# Patient Record
Sex: FEMALE | Race: WHITE | NOT HISPANIC OR LATINO | URBAN - METROPOLITAN AREA
[De-identification: names, ages, dates, MRNs, and addresses within clinical notes are randomized per-mention and may not be internally consistent; named-entity substitution may affect disease eponyms.]

---

## 2021-02-19 ENCOUNTER — LAB OUTSIDE AN ENCOUNTER (OUTPATIENT)
Dept: URBAN - METROPOLITAN AREA CLINIC 94 | Facility: CLINIC | Age: 66
End: 2021-02-19

## 2021-02-19 ENCOUNTER — OFFICE VISIT (OUTPATIENT)
Dept: URBAN - METROPOLITAN AREA CLINIC 94 | Facility: CLINIC | Age: 66
End: 2021-02-19
Payer: COMMERCIAL

## 2021-02-19 ENCOUNTER — WEB ENCOUNTER (OUTPATIENT)
Dept: URBAN - METROPOLITAN AREA CLINIC 94 | Facility: CLINIC | Age: 66
End: 2021-02-19

## 2021-02-19 DIAGNOSIS — K76.0 STEATOSIS OF LIVER: ICD-10-CM

## 2021-02-19 DIAGNOSIS — Z86.010 PERSONAL HISTORY OF COLON POLYPS: ICD-10-CM

## 2021-02-19 DIAGNOSIS — Z80.0 FAMILY HISTORY OF COLON CANCER: ICD-10-CM

## 2021-02-19 PROCEDURE — G8427 DOCREV CUR MEDS BY ELIG CLIN: HCPCS | Performed by: INTERNAL MEDICINE

## 2021-02-19 PROCEDURE — 99213 OFFICE O/P EST LOW 20 MIN: CPT | Performed by: INTERNAL MEDICINE

## 2021-02-19 PROCEDURE — 1036F TOBACCO NON-USER: CPT | Performed by: INTERNAL MEDICINE

## 2021-02-19 PROCEDURE — 3017F COLORECTAL CA SCREEN DOC REV: CPT | Performed by: INTERNAL MEDICINE

## 2021-02-19 PROCEDURE — G9903 PT SCRN TBCO ID AS NON USER: HCPCS | Performed by: INTERNAL MEDICINE

## 2021-02-19 RX ORDER — GLIMEPIRIDE 4 MG/1
1 TABLET WITH BREAKFAST OR THE FIRST MAIN MEAL OF THE DAY TABLET ORAL BID
Status: ACTIVE | COMMUNITY

## 2021-02-19 RX ORDER — INSULIN GLARGINE 100 [IU]/ML
AS DIRECTED INJECTION, SOLUTION SUBCUTANEOUS
Status: ACTIVE | COMMUNITY

## 2021-02-19 RX ORDER — OMEPRAZOLE 40 MG/1
TAKE 1 CAPSULE (40 MG) BY ORAL ROUTE ONCE DAILY BEFORE A MEAL FOR 90 DAYS CAPSULE, DELAYED RELEASE PELLETS ORAL 1
Qty: 90 | Refills: 1 | Status: DISCONTINUED | COMMUNITY
Start: 2017-12-13

## 2021-02-19 RX ORDER — HYOSCYAMINE SULFATE 0.12 MG/1
PLACE 1 TABLET UNDER TONGUE BY TRANSLINGUAL ROUTE 2 TIMES A DAY AS NEEDED FOR 30 DAYS TABLET, ORALLY DISINTEGRATING ORAL 2
Qty: 60 | Refills: 5 | Status: DISCONTINUED | COMMUNITY
Start: 2017-12-13

## 2021-02-19 RX ORDER — ASCORBIC ACID 1000 MG
1 TABLET TABLET ORAL ONCE A DAY
Status: ACTIVE | COMMUNITY

## 2021-02-19 RX ORDER — ACETAMINOPHEN 500 MG
1 CAPSULE TABLET ORAL DAILY
Status: ACTIVE | COMMUNITY

## 2021-02-19 RX ORDER — LISINOPRIL AND HYDROCHLOROTHIAZIDE 20; 25 MG/1; MG/1
1 TABLET TABLET ORAL ONCE A DAY
Refills: 0 | Status: ACTIVE | COMMUNITY
Start: 1900-01-01

## 2021-02-19 RX ORDER — SIMVASTATIN 40 MG/1
1 TABLET IN THE EVENING TABLET, FILM COATED ORAL ONCE A DAY
Status: ACTIVE | COMMUNITY

## 2021-02-19 RX ORDER — METFORMIN HYDROCHLORIDE 500 MG/1
1 TABLET WITH A MEAL TABLET, FILM COATED ORAL BID
Status: ACTIVE | COMMUNITY

## 2021-02-19 RX ORDER — GABAPENTIN 300 MG/1
1 TABLET CAPSULE ORAL AT BEDTIME
Refills: 0 | Status: ACTIVE | COMMUNITY
Start: 1900-01-01

## 2021-02-19 NOTE — HPI-TODAY'S VISIT:
Ms. Vaughn presents today for surveillance colonoscopy.  Her last colonoscopy was in 2017 by Dr. Preciado with complaints of diarrhea.  She was found to have several (4) polyps which were removed and found to be tubular adenomas and hyperplastic polyps.  She reports that she has 2 brothers with colon cancer.  She denies blood in stool, rectal bleeding, abdominal pain, weight loss.  She reports diarrhea with taking metformin.  She has ocassional pyrosis which she successfully treats with OTC antacids.  The risks benefits and alternatives were discussed.

## 2021-03-26 ENCOUNTER — OFFICE VISIT (OUTPATIENT)
Dept: URBAN - METROPOLITAN AREA SURGERY CENTER 17 | Facility: SURGERY CENTER | Age: 66
End: 2021-03-26

## 2021-04-07 PROBLEM — 428283002 HISTORY OF POLYP OF COLON: Status: ACTIVE | Noted: 2021-02-19

## 2021-04-14 ENCOUNTER — OFFICE VISIT (OUTPATIENT)
Dept: URBAN - METROPOLITAN AREA CLINIC 94 | Facility: CLINIC | Age: 66
End: 2021-04-14

## 2021-04-30 ENCOUNTER — CLAIMS CREATED FROM THE CLAIM WINDOW (OUTPATIENT)
Dept: URBAN - METROPOLITAN AREA CLINIC 4 | Facility: CLINIC | Age: 66
End: 2021-04-30
Payer: COMMERCIAL

## 2021-04-30 ENCOUNTER — OFFICE VISIT (OUTPATIENT)
Dept: URBAN - METROPOLITAN AREA SURGERY CENTER 17 | Facility: SURGERY CENTER | Age: 66
End: 2021-04-30
Payer: COMMERCIAL

## 2021-04-30 DIAGNOSIS — D12.0 BENIGN NEOPLASM OF CECUM: ICD-10-CM

## 2021-04-30 DIAGNOSIS — K63.89 STENOSIS OF ILEOCECAL VALVE: ICD-10-CM

## 2021-04-30 DIAGNOSIS — D12.0 ADENOMA OF CECUM: ICD-10-CM

## 2021-04-30 DIAGNOSIS — Z86.010 H/O ADENOMATOUS POLYP OF COLON: ICD-10-CM

## 2021-04-30 DIAGNOSIS — D12.3 BENIGN NEOPLASM OF TRANSVERSE COLON: ICD-10-CM

## 2021-04-30 DIAGNOSIS — K63.89 BACTERIAL OVERGROWTH SYNDROME: ICD-10-CM

## 2021-04-30 DIAGNOSIS — D12.3 ADENOMA OF TRANSVERSE COLON: ICD-10-CM

## 2021-04-30 PROCEDURE — 88305 TISSUE EXAM BY PATHOLOGIST: CPT | Performed by: PATHOLOGY

## 2021-04-30 PROCEDURE — 45380 COLONOSCOPY AND BIOPSY: CPT | Performed by: INTERNAL MEDICINE

## 2021-04-30 PROCEDURE — 45385 COLONOSCOPY W/LESION REMOVAL: CPT | Performed by: INTERNAL MEDICINE

## 2021-04-30 PROCEDURE — G8907 PT DOC NO EVENTS ON DISCHARG: HCPCS | Performed by: INTERNAL MEDICINE

## 2021-04-30 RX ORDER — GABAPENTIN 300 MG/1
1 TABLET CAPSULE ORAL AT BEDTIME
Refills: 0 | Status: ACTIVE | COMMUNITY
Start: 1900-01-01

## 2021-04-30 RX ORDER — LISINOPRIL AND HYDROCHLOROTHIAZIDE 20; 25 MG/1; MG/1
1 TABLET TABLET ORAL ONCE A DAY
Refills: 0 | Status: ACTIVE | COMMUNITY
Start: 1900-01-01

## 2021-04-30 RX ORDER — INSULIN GLARGINE 100 [IU]/ML
AS DIRECTED INJECTION, SOLUTION SUBCUTANEOUS
Status: ACTIVE | COMMUNITY

## 2021-04-30 RX ORDER — SIMVASTATIN 40 MG/1
1 TABLET IN THE EVENING TABLET, FILM COATED ORAL ONCE A DAY
Status: ACTIVE | COMMUNITY

## 2021-04-30 RX ORDER — ASCORBIC ACID 1000 MG
1 TABLET TABLET ORAL ONCE A DAY
Status: ACTIVE | COMMUNITY

## 2021-04-30 RX ORDER — METFORMIN HYDROCHLORIDE 500 MG/1
1 TABLET WITH A MEAL TABLET, FILM COATED ORAL BID
Status: ACTIVE | COMMUNITY

## 2021-04-30 RX ORDER — ACETAMINOPHEN 500 MG
1 CAPSULE TABLET ORAL DAILY
Status: ACTIVE | COMMUNITY

## 2021-04-30 RX ORDER — GLIMEPIRIDE 4 MG/1
1 TABLET WITH BREAKFAST OR THE FIRST MAIN MEAL OF THE DAY TABLET ORAL BID
Status: ACTIVE | COMMUNITY

## 2021-05-12 ENCOUNTER — OFFICE VISIT (OUTPATIENT)
Dept: URBAN - METROPOLITAN AREA CLINIC 94 | Facility: CLINIC | Age: 66
End: 2021-05-12

## 2021-05-18 ENCOUNTER — OFFICE VISIT (OUTPATIENT)
Dept: URBAN - METROPOLITAN AREA CLINIC 94 | Facility: CLINIC | Age: 66
End: 2021-05-18
Payer: COMMERCIAL

## 2021-05-18 ENCOUNTER — LAB OUTSIDE AN ENCOUNTER (OUTPATIENT)
Dept: URBAN - METROPOLITAN AREA CLINIC 94 | Facility: CLINIC | Age: 66
End: 2021-05-18

## 2021-05-18 ENCOUNTER — TELEPHONE ENCOUNTER (OUTPATIENT)
Dept: URBAN - METROPOLITAN AREA CLINIC 92 | Facility: CLINIC | Age: 66
End: 2021-05-18

## 2021-05-18 VITALS
BODY MASS INDEX: 36.19 KG/M2 | HEART RATE: 64 BPM | WEIGHT: 212 LBS | HEIGHT: 64 IN | DIASTOLIC BLOOD PRESSURE: 75 MMHG | TEMPERATURE: 98.4 F | SYSTOLIC BLOOD PRESSURE: 140 MMHG

## 2021-05-18 DIAGNOSIS — R19.7 DIARRHEA, UNSPECIFIED TYPE: ICD-10-CM

## 2021-05-18 DIAGNOSIS — R13.13 PHARYNGEAL DYSPHAGIA: ICD-10-CM

## 2021-05-18 DIAGNOSIS — R12 PYROSIS: ICD-10-CM

## 2021-05-18 DIAGNOSIS — Z86.010 HISTORY OF COLONIC POLYPS: ICD-10-CM

## 2021-05-18 PROBLEM — 428283002: Status: ACTIVE | Noted: 2021-05-18

## 2021-05-18 PROCEDURE — 99213 OFFICE O/P EST LOW 20 MIN: CPT | Performed by: INTERNAL MEDICINE

## 2021-05-18 RX ORDER — GLIMEPIRIDE 4 MG/1
1 TABLET WITH BREAKFAST OR THE FIRST MAIN MEAL OF THE DAY TABLET ORAL BID
Status: ACTIVE | COMMUNITY

## 2021-05-18 RX ORDER — SIMVASTATIN 40 MG/1
1 TABLET IN THE EVENING TABLET, FILM COATED ORAL ONCE A DAY
Status: ACTIVE | COMMUNITY

## 2021-05-18 RX ORDER — ASCORBIC ACID 1000 MG
1 TABLET TABLET ORAL ONCE A DAY
Status: ON HOLD | COMMUNITY

## 2021-05-18 RX ORDER — INSULIN GLARGINE 100 [IU]/ML
AS DIRECTED INJECTION, SOLUTION SUBCUTANEOUS
Status: ACTIVE | COMMUNITY

## 2021-05-18 RX ORDER — GABAPENTIN 300 MG/1
1 TABLET CAPSULE ORAL AT BEDTIME
Refills: 0 | Status: ACTIVE | COMMUNITY
Start: 1900-01-01

## 2021-05-18 RX ORDER — METFORMIN HYDROCHLORIDE 500 MG/1
1 TABLET WITH A MEAL TABLET, FILM COATED ORAL BID
Status: ACTIVE | COMMUNITY

## 2021-05-18 RX ORDER — ACETAMINOPHEN 500 MG
1 CAPSULE TABLET ORAL DAILY
Status: ACTIVE | COMMUNITY

## 2021-05-18 RX ORDER — OMEPRAZOLE 40 MG/1
1 CAPSULE 30 MINUTES BEFORE MORNING MEAL CAPSULE, DELAYED RELEASE ORAL ONCE A DAY
Qty: 30 | OUTPATIENT
Start: 2021-05-18

## 2021-05-18 RX ORDER — LISINOPRIL AND HYDROCHLOROTHIAZIDE 20; 25 MG/1; MG/1
1 TABLET TABLET ORAL ONCE A DAY
Refills: 0 | Status: ACTIVE | COMMUNITY
Start: 1900-01-01

## 2021-05-18 NOTE — HPI-TODAY'S VISIT:
Ms. Vaughn presents today in followup from her colonoscopy 4/30/21.  Findings included one 8 mm tubular adenoma in the cecum and two 5-7 mm tubular adenomas in the transverse colon.  She reports a family history of 2 brothers with colon cancer, one with stage IV and liver cancer.  She also reports an uncle with colon cancer.  1st degree relative counseling provided in regards to her three children over the age of 40.   Ms. Vaughn also reports concerns with heartburn 4-5 times per week regardless of food intake.  She also reports dysphagia with some solids and fluids which occurs 2-3 times per week and has been present for several years.  Of note - she reports gastric sleeve 6 years ago.  She also complains of loose stools with no identified triggers.  She has not been out of the country, does not have nausea/vomiting.  This has improved some since decreasing dose of metformin from 1 gram to 50 mg twice daily.

## 2021-05-20 PROBLEM — 21101000119105: Status: ACTIVE | Noted: 2021-05-18

## 2021-06-04 ENCOUNTER — OFFICE VISIT (OUTPATIENT)
Dept: URBAN - METROPOLITAN AREA SURGERY CENTER 17 | Facility: SURGERY CENTER | Age: 66
End: 2021-06-04
Payer: COMMERCIAL

## 2021-06-04 DIAGNOSIS — K22.4 CORKSCREW ESOPHAGUS: ICD-10-CM

## 2021-06-04 DIAGNOSIS — R13.19 CERVICAL DYSPHAGIA: ICD-10-CM

## 2021-06-04 DIAGNOSIS — K31.89 ACQUIRED DEFORMITY OF DUODENUM: ICD-10-CM

## 2021-06-04 DIAGNOSIS — K21.00 ALKALINE REFLUX ESOPHAGITIS: ICD-10-CM

## 2021-06-04 PROCEDURE — 43239 EGD BIOPSY SINGLE/MULTIPLE: CPT | Performed by: INTERNAL MEDICINE

## 2021-06-04 PROCEDURE — 43249 ESOPH EGD DILATION <30 MM: CPT | Performed by: INTERNAL MEDICINE

## 2021-06-04 PROCEDURE — G8907 PT DOC NO EVENTS ON DISCHARG: HCPCS | Performed by: INTERNAL MEDICINE

## 2021-06-04 RX ORDER — ACETAMINOPHEN 500 MG
1 CAPSULE TABLET ORAL DAILY
Status: ACTIVE | COMMUNITY

## 2021-06-04 RX ORDER — GABAPENTIN 300 MG/1
1 TABLET CAPSULE ORAL AT BEDTIME
Refills: 0 | Status: ACTIVE | COMMUNITY
Start: 1900-01-01

## 2021-06-04 RX ORDER — METFORMIN HYDROCHLORIDE 500 MG/1
1 TABLET WITH A MEAL TABLET, FILM COATED ORAL BID
Status: ACTIVE | COMMUNITY

## 2021-06-04 RX ORDER — ASCORBIC ACID 1000 MG
1 TABLET TABLET ORAL ONCE A DAY
Status: ON HOLD | COMMUNITY

## 2021-06-04 RX ORDER — LISINOPRIL AND HYDROCHLOROTHIAZIDE 20; 25 MG/1; MG/1
1 TABLET TABLET ORAL ONCE A DAY
Refills: 0 | Status: ACTIVE | COMMUNITY
Start: 1900-01-01

## 2021-06-04 RX ORDER — GLIMEPIRIDE 4 MG/1
1 TABLET WITH BREAKFAST OR THE FIRST MAIN MEAL OF THE DAY TABLET ORAL BID
Status: ACTIVE | COMMUNITY

## 2021-06-04 RX ORDER — SIMVASTATIN 40 MG/1
1 TABLET IN THE EVENING TABLET, FILM COATED ORAL ONCE A DAY
Status: ACTIVE | COMMUNITY

## 2021-06-04 RX ORDER — OMEPRAZOLE 40 MG/1
1 CAPSULE 30 MINUTES BEFORE MORNING MEAL CAPSULE, DELAYED RELEASE ORAL ONCE A DAY
Qty: 30 | Status: ACTIVE | COMMUNITY
Start: 2021-05-18

## 2021-06-04 RX ORDER — INSULIN GLARGINE 100 [IU]/ML
AS DIRECTED INJECTION, SOLUTION SUBCUTANEOUS
Status: ACTIVE | COMMUNITY

## 2021-07-06 ENCOUNTER — DASHBOARD ENCOUNTERS (OUTPATIENT)
Age: 66
End: 2021-07-06

## 2021-07-06 ENCOUNTER — LAB OUTSIDE AN ENCOUNTER (OUTPATIENT)
Dept: URBAN - METROPOLITAN AREA CLINIC 94 | Facility: CLINIC | Age: 66
End: 2021-07-06

## 2021-07-06 ENCOUNTER — OFFICE VISIT (OUTPATIENT)
Dept: URBAN - METROPOLITAN AREA CLINIC 94 | Facility: CLINIC | Age: 66
End: 2021-07-06
Payer: COMMERCIAL

## 2021-07-06 VITALS
HEART RATE: 65 BPM | HEIGHT: 64 IN | TEMPERATURE: 97.3 F | DIASTOLIC BLOOD PRESSURE: 58 MMHG | BODY MASS INDEX: 36.37 KG/M2 | SYSTOLIC BLOOD PRESSURE: 156 MMHG | WEIGHT: 213 LBS

## 2021-07-06 DIAGNOSIS — K29.50 CHRONIC GASTRITIS WITHOUT BLEEDING, UNSPECIFIED GASTRITIS TYPE: ICD-10-CM

## 2021-07-06 DIAGNOSIS — K21.9 GASTROESOPHAGEAL REFLUX DISEASE WITHOUT ESOPHAGITIS: ICD-10-CM

## 2021-07-06 DIAGNOSIS — R19.7 DIARRHEA, UNSPECIFIED TYPE: ICD-10-CM

## 2021-07-06 PROCEDURE — 99213 OFFICE O/P EST LOW 20 MIN: CPT | Performed by: INTERNAL MEDICINE

## 2021-07-06 RX ORDER — GABAPENTIN 300 MG/1
1 TABLET CAPSULE ORAL AT BEDTIME
Refills: 0 | Status: ACTIVE | COMMUNITY
Start: 1900-01-01

## 2021-07-06 RX ORDER — METFORMIN HYDROCHLORIDE 500 MG/1
1 TABLET WITH A MEAL TABLET, FILM COATED ORAL BID
Status: ACTIVE | COMMUNITY

## 2021-07-06 RX ORDER — INSULIN GLARGINE 100 [IU]/ML
AS DIRECTED INJECTION, SOLUTION SUBCUTANEOUS
Status: ACTIVE | COMMUNITY

## 2021-07-06 RX ORDER — GLIMEPIRIDE 4 MG/1
1 TABLET WITH BREAKFAST OR THE FIRST MAIN MEAL OF THE DAY TABLET ORAL BID
Status: ACTIVE | COMMUNITY

## 2021-07-06 RX ORDER — ASCORBIC ACID 1000 MG
1 TABLET TABLET ORAL ONCE A DAY
Status: ON HOLD | COMMUNITY

## 2021-07-06 RX ORDER — OMEPRAZOLE 40 MG/1
1 CAPSULE 30 MINUTES BEFORE MORNING MEAL CAPSULE, DELAYED RELEASE ORAL ONCE A DAY
Qty: 90 | Refills: 2
Start: 2021-05-18

## 2021-07-06 RX ORDER — OMEPRAZOLE 40 MG/1
1 CAPSULE 30 MINUTES BEFORE MORNING MEAL CAPSULE, DELAYED RELEASE ORAL ONCE A DAY
Qty: 30 | Status: ACTIVE | COMMUNITY
Start: 2021-05-18

## 2021-07-06 RX ORDER — ACETAMINOPHEN 500 MG
1 CAPSULE TABLET ORAL DAILY
Status: ACTIVE | COMMUNITY

## 2021-07-06 RX ORDER — SIMVASTATIN 40 MG/1
1 TABLET IN THE EVENING TABLET, FILM COATED ORAL ONCE A DAY
Status: ACTIVE | COMMUNITY

## 2021-07-06 RX ORDER — LISINOPRIL AND HYDROCHLOROTHIAZIDE 20; 25 MG/1; MG/1
1 TABLET TABLET ORAL ONCE A DAY
Refills: 0 | Status: ACTIVE | COMMUNITY
Start: 1900-01-01

## 2021-07-06 NOTE — HPI-TODAY'S VISIT:
Ms. Vaughn is seen today for a scheduled follow up visit post EGD for pyrosis and dysphagia.  EGD revealed erythema of the stomach and sleeve as well as spasticity of the esophagus.  Esophagus was dilated; she had previously been started on omeprazole.  Today she reports feeling better, no further difficulty swallowing and no further acid reflux.  She states that her IBS is giving her problems with increased diarrhea over the past 2 weeks. She reports rare episode of constipation.  GERD is controlled with omeprazole.  5/18 visit: Ms. Vaughn presents today in follow up from her colonoscopy 4/30/21.  Findings included one 8 mm tubular adenoma in the cecum and two 5-7 mm tubular adenomas in the transverse colon.  She reports a family history of 2 brothers with colon cancer, one with stage IV and liver cancer.  She also reports an uncle with colon cancer.  1st degree relative counseling provided in regards to her three children over the age of 40.   Ms. Vaughn also reports concerns with heartburn 4-5 times per week regardless of food intake.  She also reports dysphagia with some solids and fluids which occurs 2-3 times per week and has been present for several years.  Of note - she reports gastric sleeve 6 years ago.  She also complains of loose stools with no identified triggers.  She has not been out of the country, does not have nausea/vomiting.  This has improved some since decreasing dose of metformin from 1 gram to 500 mg twice daily.

## 2021-07-08 PROBLEM — 266435005: Status: ACTIVE | Noted: 2021-07-06

## 2021-07-08 PROBLEM — 8493009: Status: ACTIVE | Noted: 2021-07-06

## 2024-07-12 ENCOUNTER — OFFICE VISIT (OUTPATIENT)
Dept: URBAN - METROPOLITAN AREA CLINIC 94 | Facility: CLINIC | Age: 69
End: 2024-07-12
Payer: MEDICARE

## 2024-07-12 VITALS
HEIGHT: 64 IN | BODY MASS INDEX: 38.76 KG/M2 | WEIGHT: 227 LBS | DIASTOLIC BLOOD PRESSURE: 71 MMHG | HEART RATE: 70 BPM | OXYGEN SATURATION: 95 % | TEMPERATURE: 98.1 F | SYSTOLIC BLOOD PRESSURE: 159 MMHG

## 2024-07-12 DIAGNOSIS — R19.4 CHANGE IN BOWEL HABITS: ICD-10-CM

## 2024-07-12 DIAGNOSIS — R13.19 ESOPHAGEAL DYSPHAGIA: ICD-10-CM

## 2024-07-12 PROCEDURE — 99203 OFFICE O/P NEW LOW 30 MIN: CPT

## 2024-07-12 RX ORDER — INSULIN DEGLUDEC INJECTION 100 U/ML
AS DIRECTED INJECTION, SOLUTION SUBCUTANEOUS
Status: ACTIVE | COMMUNITY

## 2024-07-12 RX ORDER — PIOGLITAZONE 30 MG/1
1 TABLET TABLET ORAL ONCE A DAY
Status: ACTIVE | COMMUNITY

## 2024-07-12 RX ORDER — OMEPRAZOLE 40 MG/1
1 CAPSULE 30 MINUTES BEFORE MORNING MEAL CAPSULE, DELAYED RELEASE ORAL ONCE A DAY
Qty: 90 | Refills: 2 | Status: ACTIVE | COMMUNITY
Start: 2021-05-18

## 2024-07-12 RX ORDER — LISINOPRIL AND HYDROCHLOROTHIAZIDE 20; 25 MG/1; MG/1
1 TABLET TABLET ORAL ONCE A DAY
Refills: 0 | Status: ACTIVE | COMMUNITY
Start: 1900-01-01

## 2024-07-12 RX ORDER — ACETAMINOPHEN 500 MG
1 CAPSULE TABLET ORAL DAILY
Status: ACTIVE | COMMUNITY

## 2024-07-12 RX ORDER — FAMOTIDINE 20 MG/1
TAKE 1 TABLET TABLET, FILM COATED ORAL ONCE A DAY
Qty: 30 | Refills: 1 | OUTPATIENT
Start: 2024-07-12

## 2024-07-12 RX ORDER — SIMVASTATIN 40 MG/1
1 TABLET IN THE EVENING TABLET, FILM COATED ORAL ONCE A DAY
Status: ACTIVE | COMMUNITY

## 2024-07-12 RX ORDER — AMLODIPINE BESYLATE 5 MG/1
1 TABLET TABLET ORAL ONCE A DAY
Status: ACTIVE | COMMUNITY

## 2024-07-12 RX ORDER — METFORMIN HYDROCHLORIDE 500 MG/1
1 TABLET WITH A MEAL TABLET, FILM COATED ORAL BID
Status: ACTIVE | COMMUNITY

## 2024-07-12 NOTE — HPI-TODAY'S VISIT:
69 y/o F hx of GERD presents for NP eval of dysphagia, change in bowel habits.  Reports change in bowel habits with increase fecal urgency for last several mon. Does have hx of chronic diarrhea but admits this is different pattern for her. Does not endorse diarrhea or loose stool but admits to increase frequency of BMs upt to 3x/d. No melena, hematochezia. Prior used to take Immodium PRN for diarrhea but admits this gives her constipation. Denies any abd pain, unintentional wt loss.   Also endorses liquid dysphagia x 6 mo. Does have hx of dysphagia with dilation in 2021 - admits this gave her good relief but noticed return a few mo ago. Denies epigastric pain, n/v, reflux. Takes omeprazole QD for several yrs.  Denies dysphagia to solid food, pills and no odynophagia.   Brother recently passed from stage 4 CRC last yr.  Pt is currently being eval'd by cardio for abnl EKG. No lakeisha gdisease. No blood thinners

## 2024-09-09 ENCOUNTER — ERX REFILL RESPONSE (OUTPATIENT)
Dept: URBAN - METROPOLITAN AREA CLINIC 94 | Facility: CLINIC | Age: 69
End: 2024-09-09

## 2024-09-09 RX ORDER — FAMOTIDINE 20 MG/1
TAKE 1 TABLET TABLET, FILM COATED ORAL ONCE A DAY
Qty: 30 | Refills: 1 | OUTPATIENT

## 2025-06-02 ENCOUNTER — TELEPHONE ENCOUNTER (OUTPATIENT)
Dept: URBAN - METROPOLITAN AREA CLINIC 94 | Facility: CLINIC | Age: 70
End: 2025-06-02

## 2025-06-03 ENCOUNTER — OFFICE VISIT (OUTPATIENT)
Dept: URBAN - METROPOLITAN AREA CLINIC 94 | Facility: CLINIC | Age: 70
End: 2025-06-03
Payer: MEDICARE

## 2025-06-03 DIAGNOSIS — R13.19 ESOPHAGEAL DYSPHAGIA: ICD-10-CM

## 2025-06-03 DIAGNOSIS — K21.00 GASTROESOPHAGEAL REFLUX DISEASE WITH ESOPHAGITIS WITHOUT HEMORRHAGE: ICD-10-CM

## 2025-06-03 DIAGNOSIS — K64.8 INTERNAL HEMORRHOIDS: ICD-10-CM

## 2025-06-03 DIAGNOSIS — R19.5 LOOSE STOOLS: ICD-10-CM

## 2025-06-03 DIAGNOSIS — R19.4 CHANGE IN BOWEL HABITS: ICD-10-CM

## 2025-06-03 DIAGNOSIS — R15.2 FECAL URGENCY: ICD-10-CM

## 2025-06-03 PROBLEM — 266433003: Status: ACTIVE | Noted: 2025-06-03

## 2025-06-03 PROCEDURE — 99214 OFFICE O/P EST MOD 30 MIN: CPT

## 2025-06-03 RX ORDER — METOPROLOL TARTRATE 50 MG/1
1 TABLET WITH FOOD TABLET, FILM COATED ORAL TWICE A DAY
Status: ACTIVE | COMMUNITY

## 2025-06-03 RX ORDER — AMLODIPINE BESYLATE 5 MG/1
1 TABLET TABLET ORAL ONCE A DAY
Status: ACTIVE | COMMUNITY

## 2025-06-03 RX ORDER — INSULIN DEGLUDEC INJECTION 100 U/ML
AS DIRECTED INJECTION, SOLUTION SUBCUTANEOUS
Status: ACTIVE | COMMUNITY

## 2025-06-03 RX ORDER — PIOGLITAZONE 30 MG/1
1 TABLET TABLET ORAL ONCE A DAY
Status: ACTIVE | COMMUNITY

## 2025-06-03 RX ORDER — APIXABAN 5 MG/1
AS DIRECTED TABLET, FILM COATED ORAL
Status: ACTIVE | COMMUNITY

## 2025-06-03 RX ORDER — ACETAMINOPHEN 500 MG
1 CAPSULE TABLET ORAL DAILY
Status: ACTIVE | COMMUNITY

## 2025-06-03 RX ORDER — LISINOPRIL AND HYDROCHLOROTHIAZIDE 20; 25 MG/1; MG/1
1 TABLET TABLET ORAL ONCE A DAY
Refills: 0 | Status: ACTIVE | COMMUNITY
Start: 1900-01-01

## 2025-06-03 RX ORDER — GABAPENTIN 400 MG/1
1 CAPSULE CAPSULE ORAL ONCE A DAY
Status: ACTIVE | COMMUNITY

## 2025-06-03 RX ORDER — METFORMIN HYDROCHLORIDE 500 MG/1
1 TABLET WITH A MEAL TABLET, FILM COATED ORAL BID
Status: ACTIVE | COMMUNITY

## 2025-06-03 RX ORDER — SIMVASTATIN 40 MG/1
1 TABLET IN THE EVENING TABLET, FILM COATED ORAL ONCE A DAY
Status: ACTIVE | COMMUNITY

## 2025-06-03 NOTE — HPI-TODAY'S VISIT:
67 y/o F hx of SSS (pacemaker), AFib (Eliquis) presents for f/u eval of dysphagia, change in bowel habits.  Her sx are unchanged. EGD/CLS was planned last year but had significant cardiac testing resulting in pacemaker - SSS, Afib (Eliquis) so this had to be post poned. She reports her GI sx are unchanged but has noticed some increased rectal bleeding last occur 2-3 weeks ago. No rectal pain. She still endorses altneration in bowel habits between looser stools and constipation requiring digital maneuvers. She will struggle fecal urgency when stools are looser. She denies chronic diarrhea, lower abd pain, constipation. No ROSIE, unintentional wt loss. She is also still struggling with liquid dysphagia and occ solid dysphagia. No odynophagia. Denies epigastric pain, n/v, reflux. She reports dilation in past helped and lasted for a while    LAST OV 07/2024 Reports change in bowel habits with increase fecal urgency for last several mon. Does have hx of chronic diarrhea but admits this is different pattern for her. Does not endorse diarrhea or loose stool but admits to increase frequency of BMs upt to 3x/d. No melena, hematochezia. Prior used to take Immodium PRN for diarrhea but admits this gives her constipation. Denies any abd pain, unintentional wt loss.   Also endorses liquid dysphagia x 6 mo. Does have hx of dysphagia with dilation in 2021 - admits this gave her good relief but noticed return a few mo ago. Denies epigastric pain, n/v, reflux. Takes omeprazole QD for several yrs.  Denies dysphagia to solid food, pills and no odynophagia.   Brother recently passed from stage 4 CRC last yr.  04/2021 CLS: 8 mm cecal TA. 5-7 mm transverse x 2 TA. 2mm sigmoid lymphoid aggregates. IH. Repeat 5y  06/2021 EGD: Abnl motility of esophagus - suspected spasm. Dilated to 18 mm. Regular z line. Prior sleeve gastrectomy with erythema. Diffuse gastritis t/o. Normal duodenum.  bx with reactive gastritis, reflux esophagitis

## 2025-06-26 ENCOUNTER — OFFICE VISIT (OUTPATIENT)
Dept: URBAN - METROPOLITAN AREA MEDICAL CENTER 34 | Facility: MEDICAL CENTER | Age: 70
End: 2025-06-26

## 2025-06-26 RX ORDER — ACETAMINOPHEN 500 MG
1 CAPSULE TABLET ORAL DAILY
Status: ACTIVE | COMMUNITY

## 2025-06-26 RX ORDER — METOPROLOL TARTRATE 50 MG/1
1 TABLET WITH FOOD TABLET, FILM COATED ORAL TWICE A DAY
Status: ACTIVE | COMMUNITY

## 2025-06-26 RX ORDER — LISINOPRIL AND HYDROCHLOROTHIAZIDE 20; 25 MG/1; MG/1
1 TABLET TABLET ORAL ONCE A DAY
Refills: 0 | Status: ACTIVE | COMMUNITY
Start: 1900-01-01

## 2025-06-26 RX ORDER — GABAPENTIN 400 MG/1
1 CAPSULE CAPSULE ORAL ONCE A DAY
Status: ACTIVE | COMMUNITY

## 2025-06-26 RX ORDER — METFORMIN HYDROCHLORIDE 500 MG/1
1 TABLET WITH A MEAL TABLET, FILM COATED ORAL BID
Status: ACTIVE | COMMUNITY

## 2025-06-26 RX ORDER — AMLODIPINE BESYLATE 5 MG/1
1 TABLET TABLET ORAL ONCE A DAY
Status: ACTIVE | COMMUNITY

## 2025-06-26 RX ORDER — PIOGLITAZONE 30 MG/1
1 TABLET TABLET ORAL ONCE A DAY
Status: ACTIVE | COMMUNITY

## 2025-06-26 RX ORDER — SIMVASTATIN 40 MG/1
1 TABLET IN THE EVENING TABLET, FILM COATED ORAL ONCE A DAY
Status: ACTIVE | COMMUNITY

## 2025-06-26 RX ORDER — INSULIN DEGLUDEC INJECTION 100 U/ML
AS DIRECTED INJECTION, SOLUTION SUBCUTANEOUS
Status: ACTIVE | COMMUNITY

## 2025-06-26 RX ORDER — APIXABAN 5 MG/1
AS DIRECTED TABLET, FILM COATED ORAL
Status: ACTIVE | COMMUNITY

## 2025-06-27 ENCOUNTER — TELEPHONE ENCOUNTER (OUTPATIENT)
Dept: URBAN - METROPOLITAN AREA CLINIC 94 | Facility: CLINIC | Age: 70
End: 2025-06-27